# Patient Record
Sex: FEMALE | Race: WHITE
[De-identification: names, ages, dates, MRNs, and addresses within clinical notes are randomized per-mention and may not be internally consistent; named-entity substitution may affect disease eponyms.]

---

## 2014-07-10 VITALS
DIASTOLIC BLOOD PRESSURE: 70 MMHG | DIASTOLIC BLOOD PRESSURE: 70 MMHG | SYSTOLIC BLOOD PRESSURE: 126 MMHG | SYSTOLIC BLOOD PRESSURE: 126 MMHG | SYSTOLIC BLOOD PRESSURE: 126 MMHG | DIASTOLIC BLOOD PRESSURE: 70 MMHG | DIASTOLIC BLOOD PRESSURE: 70 MMHG | SYSTOLIC BLOOD PRESSURE: 126 MMHG

## 2019-06-27 ENCOUNTER — HOSPITAL ENCOUNTER (OUTPATIENT)
Dept: HOSPITAL 61 - US | Age: 64
Discharge: HOME | End: 2019-06-27
Attending: INTERNAL MEDICINE
Payer: COMMERCIAL

## 2019-06-27 VITALS — BODY MASS INDEX: 23.54 KG/M2 | HEIGHT: 67 IN | WEIGHT: 150 LBS

## 2019-06-27 DIAGNOSIS — K83.8: ICD-10-CM

## 2019-06-27 DIAGNOSIS — R63.4: ICD-10-CM

## 2019-06-27 DIAGNOSIS — K21.9: ICD-10-CM

## 2019-06-27 DIAGNOSIS — K80.20: Primary | ICD-10-CM

## 2019-06-27 PROCEDURE — 78227 HEPATOBIL SYST IMAGE W/DRUG: CPT

## 2019-06-27 PROCEDURE — A9537 TC99M MEBROFENIN: HCPCS

## 2019-06-27 PROCEDURE — 76705 ECHO EXAM OF ABDOMEN: CPT

## 2019-06-27 RX ADMIN — SINCALIDE ONE MLS/HR: 5 INJECTION, POWDER, LYOPHILIZED, FOR SOLUTION INTRAVENOUS at 09:13

## 2019-06-27 NOTE — RAD
HEPATOBILIARY SCAN WITH EJECTION FRACTION 6/27/2019 12:14 PM

 

History: Severe acid reflux, belching. Epigastric pain. Symptoms x2 weeks.

 

Procedure: Serial static images are obtained of the liver and biliary 

system in the frontal projection following IV administration of 5.5 mCi of

Technetium 99m Choletec.  After filling of the gallbladder, 1.4 mcg of 

sincalide were infused over 30 minutes and dynamic imaging continued over 

this period. The gallbladder ejection fraction was calculated.

 

Findings: There is prompt hepatic clearance of tracer from the blood pool.

There is homogeneous distribution throughout the liver.  The gallbladder 

ejection fraction measures 1% , which is markedly reduced (normal 

gallbladder EF is 35% or greater).

 

IMPRESSION: 

 

1. The cystic duct and common bile duct are patent. Negative for acute 

cholecystitis.

 

2. The gallbladder ejection fraction is markedly low. Findings may 

indicate chronic cholecystitis or gallbladder dyskinesia.

 

 

 

Electronically signed by: Balaji Thompson MD (6/27/2019 12:16 PM) [facility]

## 2019-06-27 NOTE — RAD
Limited Abdominal ultrasound for epigastric pain, weight loss.

 

Comparison: None

 

Technique and findings: Real-time grayscale and color Doppler evaluation 

of the RUQ abdominal organs is performed. 

Liver: Normal in size, contour, and echogenicity, with no focal 

parenchymal abnormalities. Hepatopedal flow within a patent portal vein..

 

Biliary: There is no intrahepatic biliary ductal dilatation, however the 

common bile duct is dilated to 14 mm. No shadowing stones or intraluminal 

filling defects are seen within the common bile duct.

Gallbladder: Notable for cholelithiasis, with no gallbladder wall 

thickening, pericholecystic fluid, or sonographic Beltran sign.

Pancreas: Visualized portions are grossly unremarkable. The pancreatic 

tail is not well visualized.

Aorta: Non-aneurysmal

IVC: Patent

Right Kidney: Normal Size, no hydronephrosis, no suspicious parenchymal 

abnormalities. Normal color flow. Mild separation of the central sinus is 

likely physiologic.

 

IMPRESSION:

1. Cholelithiasis without sonographic evidence of acute cholecystitis. 

There is marked dilatation of the common bile duct however, which could 

indicate obstruction due to choledocholithiasis. Clinical correlation is 

advised with consideration for nuclear medicine hepatobiliary scan and/or 

MRCP/ERCP as clinically warranted.

 

Findings were relayed to Dr. Cortez immediately upon interpretation of 

the examination.

 

Electronically signed by: Waqas Rogers MD (6/27/2019 11:27 AM) [facility]

## 2019-07-03 ENCOUNTER — HOSPITAL ENCOUNTER (OUTPATIENT)
Dept: HOSPITAL 61 - MRI | Age: 64
Discharge: HOME | End: 2019-07-03
Attending: INTERNAL MEDICINE
Payer: COMMERCIAL

## 2019-07-03 DIAGNOSIS — K83.8: ICD-10-CM

## 2019-07-03 DIAGNOSIS — K80.20: Primary | ICD-10-CM

## 2019-07-03 PROCEDURE — 74181 MRI ABDOMEN W/O CONTRAST: CPT

## 2019-07-03 NOTE — RAD
Examination: MRCP WO CONTRAST

 

History: Abdominal pain for 2 months. Gallstones.

 

Comparison/Correlation: 6/27/2019 hepatobiliary scan and Limited abdominal

ultrasound exam

 

Findings: Multiplanar, multisequence images of the upper abdomen were 

obtained according to MRCP protocol. Thick slab images were acquired. MIP 

images provided.

 

Multiple small calculi are present within the gallbladder. Calculus 

involvement of the gallbladder neck appears be present as well with at 

least one small calculus evident measuring 0.4 cm diameter. There is no 

pericholecystic fluid. No biliary dilatation. Common bile duct diameter is

less than 0.4 cm. Diffusely distended common hepatic duct is evident 

measuring up to 1.7 cm diameter. Right and left common hepatic ducts

 

No upper abdominal ascites. No enlarged abdominal lymph nodes. Spleen, 

adrenal glands, and kidneys are normal. Pancreas is unremarkable with no 

evidence of pancreatic divisum.

 

 

Impression:

Diffusely distended common hepatic duct. No intrahepatic biliary 

dilatation. 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

 

Cholelithiasis. Calculus at the gallbladder neck as well. No findings of 

acute cholecystitis.

 

Electronically signed by: Gerber Olivera MD (7/3/2019 2:18 PM) [facility]

## 2019-09-12 ENCOUNTER — HOSPITAL ENCOUNTER (OUTPATIENT)
Dept: HOSPITAL 61 - KCIC MAMMO | Age: 64
Discharge: HOME | End: 2019-09-12
Attending: NURSE PRACTITIONER
Payer: COMMERCIAL

## 2019-09-12 DIAGNOSIS — Z12.31: Primary | ICD-10-CM

## 2019-09-12 DIAGNOSIS — N64.89: ICD-10-CM

## 2019-09-12 PROCEDURE — 77067 SCR MAMMO BI INCL CAD: CPT

## 2019-09-12 NOTE — [facility]
BILATERAL SCREENING MAMMOGRAM

 

History: Routine screening.

 

Comparison:  Bilateral mammogram December 17, 2014.

 

Technique: Routine bilateral digital mammogram views were obtained.

 

Findings: 

Breast Tissue Density C : The breasts are heterogeneously dense, which may

obscure small masses.

There is a nodular asymmetry in the retroareolar posterior right breast 

more apparent on the CC view than the MLO. The posterior breast was less 

well seen on the prior cc view. There are a couple of benign 

calcifications in each breast.

There are no dominant masses, suspicious microcalcifications, or 

architectural distortion.

 

IMPRESSION:

Nodular asymmetry in the retroareolar posterior right breast. Recommend 

further evaluation with right true lateral, spot compression CC, and 

rolled medial and lateral CC digital views. If finding persists ultrasound

is suggested.

 

BI-RADS Category 0:  Incomplete:  Need additional imaging evaluation.

 

The images were reviewed with computer aided detection.

 

Patient information is entered into the reminder system with a target due 

date for the next screening mammogram.

 

Mammography is the most sensitive method for finding small breast cancers,

but it does not detect them all and is not a substitute for careful 

clinical examination. A negative mammogram does not negate a clinically 

suspicious finding and should not result in delay in biopsying a 

clinically suspicious abnormality.

 

 "Our facility is accredited by the [employer] 

Mammography Program."

 

Electronically signed by: Charanjit Rivera MD (9/12/2019 4:38 PM) [facility]

## 2019-10-03 ENCOUNTER — HOSPITAL ENCOUNTER (OUTPATIENT)
Dept: HOSPITAL 61 - KCIC MAMMO | Age: 64
Discharge: HOME | End: 2019-10-03
Attending: NURSE PRACTITIONER
Payer: COMMERCIAL

## 2019-10-03 DIAGNOSIS — R92.8: Primary | ICD-10-CM

## 2019-10-03 PROCEDURE — 77065 DX MAMMO INCL CAD UNI: CPT

## 2019-10-03 PROCEDURE — 76641 ULTRASOUND BREAST COMPLETE: CPT

## 2019-10-04 NOTE — [facility]
Right breast diagnostic digital mammograms:

 

Reason for examination: Nodular asymmetry on screening mammogram.

 

Comparison is made to mammographic exam dated 9/12/2019.

 

Cone compression view, true lateral and rolled views in CC projection were

obtained of the right breast.

 

With these additional views, there is heterogeneously dense fibroglandular

tissue but no discrete nodule is identified. Further evaluation with 

ultrasound will follow.

 

IMPRESSION:

 

No suspicious abnormality seen mammographically. Ultrasound to follow.

 

BI-RADS Category 0: Incomplete. Needs additional imaging evaluation.

 

 

Right breast ultrasound:

 

Ultrasound examination of the right breast and axilla was performed.

 

No discrete cystic or solid nodules or architectural distortions are seen.

No abnormal appearing lymph nodes are seen in the axilla.

 

IMPRESSION:

 

No focal abnormalities evident in the right breast. Recommend routine 

mammographic follow-up.

 

BI-RADS Category 2:  Benign.

 

"Our facility is accredited by the [employer] 

Mammography Program."

 

This patient's information has been entered into a reminder system for the

patient to be notified with the results of her examination and a target 

date for the next mammogram.

 

Electronically signed by: Valencia Cruz MD (10/4/2019 7:35 AM) [facility]

## 2019-10-04 NOTE — [facility]
Right breast diagnostic digital mammograms:

 

Reason for examination: Nodular asymmetry on screening mammogram.

 

Comparison is made to mammographic exam dated 9/12/2019.

 

Cone compression view, true lateral and rolled views in CC projection were

obtained of the right breast.

 

With these additional views, there is heterogeneously dense fibroglandular

tissue but no discrete nodule is identified. Further evaluation with 

ultrasound will follow.

 

IMPRESSION:

 

No suspicious abnormality seen mammographically. Ultrasound to follow.

 

BI-RADS Category 0: Incomplete. Needs additional imaging evaluation.

 

 

Right breast ultrasound:

 

Ultrasound examination of the right breast and axilla was performed.

 

No discrete cystic or solid nodules or architectural distortions are seen.

No abnormal appearing lymph nodes are seen in the axilla.

 

IMPRESSION:

 

No focal abnormalities evident in the right breast. Recommend routine 

mammographic follow-up.

 

BI-RADS Category 2:  Benign.

 

"Our facility is accredited by the [employer] 

Mammography Program."

 

This patient's information has been entered into a reminder system for the

patient to be notified with the results of her examination and a target 

date for the next mammogram.

 

Electronically signed by: Valencia Cruz MD (10/4/2019 7:35 AM) [facility]